# Patient Record
Sex: FEMALE | Race: WHITE | Employment: UNEMPLOYED | URBAN - METROPOLITAN AREA
[De-identification: names, ages, dates, MRNs, and addresses within clinical notes are randomized per-mention and may not be internally consistent; named-entity substitution may affect disease eponyms.]

---

## 2023-09-06 ENCOUNTER — APPOINTMENT (OUTPATIENT)
Dept: GENERAL RADIOLOGY | Age: 88
End: 2023-09-06
Payer: MEDICARE

## 2023-09-06 ENCOUNTER — HOSPITAL ENCOUNTER (EMERGENCY)
Age: 88
Discharge: NURSING, REHAB OR LONG-TERM CARE | End: 2023-09-07
Attending: STUDENT IN AN ORGANIZED HEALTH CARE EDUCATION/TRAINING PROGRAM
Payer: MEDICARE

## 2023-09-06 ENCOUNTER — APPOINTMENT (OUTPATIENT)
Dept: CT IMAGING | Age: 88
End: 2023-09-06
Payer: MEDICARE

## 2023-09-06 DIAGNOSIS — I95.9 TRANSIENT HYPOTENSION: Primary | ICD-10-CM

## 2023-09-06 LAB
ALBUMIN SERPL-MCNC: 3 G/DL (ref 3.4–5)
ALBUMIN/GLOB SERPL: 0.8 {RATIO} (ref 1.1–2.2)
ALP SERPL-CCNC: 125 U/L (ref 40–129)
ALT SERPL-CCNC: 25 U/L (ref 10–40)
ANION GAP SERPL CALCULATED.3IONS-SCNC: 12 MMOL/L (ref 3–16)
AST SERPL-CCNC: 27 U/L (ref 15–37)
BASOPHILS # BLD: 0.1 K/UL (ref 0–0.2)
BASOPHILS NFR BLD: 1.2 %
BILIRUB SERPL-MCNC: 0.3 MG/DL (ref 0–1)
BUN SERPL-MCNC: 26 MG/DL (ref 7–20)
CALCIUM SERPL-MCNC: 9.3 MG/DL (ref 8.3–10.6)
CHLORIDE SERPL-SCNC: 104 MMOL/L (ref 99–110)
CO2 SERPL-SCNC: 25 MMOL/L (ref 21–32)
CREAT SERPL-MCNC: 0.6 MG/DL (ref 0.6–1.2)
DEPRECATED RDW RBC AUTO: 14.8 % (ref 12.4–15.4)
EOSINOPHIL # BLD: 0.1 K/UL (ref 0–0.6)
EOSINOPHIL NFR BLD: 1.7 %
GFR SERPLBLD CREATININE-BSD FMLA CKD-EPI: >60 ML/MIN/{1.73_M2}
GLUCOSE SERPL-MCNC: 107 MG/DL (ref 70–99)
HCT VFR BLD AUTO: 31.4 % (ref 36–48)
HGB BLD-MCNC: 10 G/DL (ref 12–16)
LACTATE BLDV-SCNC: 1 MMOL/L (ref 0.4–1.9)
LYMPHOCYTES # BLD: 0.8 K/UL (ref 1–5.1)
LYMPHOCYTES NFR BLD: 8.8 %
MCH RBC QN AUTO: 29.5 PG (ref 26–34)
MCHC RBC AUTO-ENTMCNC: 31.9 G/DL (ref 31–36)
MCV RBC AUTO: 92.4 FL (ref 80–100)
MONOCYTES # BLD: 0.5 K/UL (ref 0–1.3)
MONOCYTES NFR BLD: 5.4 %
NEUTROPHILS # BLD: 7.1 K/UL (ref 1.7–7.7)
NEUTROPHILS NFR BLD: 82.9 %
PLATELET # BLD AUTO: 487 K/UL (ref 135–450)
PMV BLD AUTO: 8.1 FL (ref 5–10.5)
POTASSIUM SERPL-SCNC: 4.7 MMOL/L (ref 3.5–5.1)
PROT SERPL-MCNC: 6.9 G/DL (ref 6.4–8.2)
RBC # BLD AUTO: 3.39 M/UL (ref 4–5.2)
SODIUM SERPL-SCNC: 141 MMOL/L (ref 136–145)
WBC # BLD AUTO: 8.5 K/UL (ref 4–11)

## 2023-09-06 PROCEDURE — 87040 BLOOD CULTURE FOR BACTERIA: CPT

## 2023-09-06 PROCEDURE — 93005 ELECTROCARDIOGRAM TRACING: CPT | Performed by: STUDENT IN AN ORGANIZED HEALTH CARE EDUCATION/TRAINING PROGRAM

## 2023-09-06 PROCEDURE — 80053 COMPREHEN METABOLIC PANEL: CPT

## 2023-09-06 PROCEDURE — 71045 X-RAY EXAM CHEST 1 VIEW: CPT

## 2023-09-06 PROCEDURE — 99285 EMERGENCY DEPT VISIT HI MDM: CPT

## 2023-09-06 PROCEDURE — 36415 COLL VENOUS BLD VENIPUNCTURE: CPT

## 2023-09-06 PROCEDURE — 83605 ASSAY OF LACTIC ACID: CPT

## 2023-09-06 PROCEDURE — 2580000003 HC RX 258: Performed by: PHYSICIAN ASSISTANT

## 2023-09-06 PROCEDURE — 85025 COMPLETE CBC W/AUTO DIFF WBC: CPT

## 2023-09-06 RX ORDER — 0.9 % SODIUM CHLORIDE 0.9 %
500 INTRAVENOUS SOLUTION INTRAVENOUS ONCE
Status: COMPLETED | OUTPATIENT
Start: 2023-09-06 | End: 2023-09-06

## 2023-09-06 RX ORDER — LEVOFLOXACIN 500 MG/1
500 TABLET, FILM COATED ORAL DAILY
Qty: 10 TABLET | Refills: 0 | Status: SHIPPED | OUTPATIENT
Start: 2023-09-06 | End: 2023-09-16

## 2023-09-06 RX ADMIN — SODIUM CHLORIDE 500 ML: 9 INJECTION, SOLUTION INTRAVENOUS at 22:16

## 2023-09-06 ASSESSMENT — PAIN SCALES - GENERAL: PAINLEVEL_OUTOF10: 0

## 2023-09-06 ASSESSMENT — PAIN - FUNCTIONAL ASSESSMENT: PAIN_FUNCTIONAL_ASSESSMENT: NONE - DENIES PAIN

## 2023-09-06 ASSESSMENT — ENCOUNTER SYMPTOMS: COLOR CHANGE: 1

## 2023-09-06 NOTE — ED PROVIDER NOTES
In addition to the advanced practice provider, I personally saw Serina Alejandros and performed a substantive portion of the visit including all aspects of the medical decision making. Medical Decision Making    Patient brought in by EMS for evaluation after apparent hypotensive episode at nursing facility. Patient recently had CT angiogram of the right lower extremity showing no acute arterial thrombus after recent right distal fibular and tibia fracture. Patient has been getting weaker nursing facility, I spoke to her daughter and they do want uphold DNR comfort care status. Specifically no painful measures, chest compressions, CPR. There agreeable to us looking for infectious source or signs of MI. Patient is generally weak on exam, alert and answering questions with her daughter. Right foot is purple in color without palpable pulse, this is documented from yesterday with CT angio of the right lower extremity was performed. She has nonlabored breaths. Cardiac  rate and rhythm. EKG  The Ekg interpreted by me in the absence of a cardiologist shows. Normal sinus rhythm with ventricular rate of 96. Occasional PVC noted. Axis normal.  QTc appropriate. No specific ST or T wave abnormality is noted. No significant change from prior 4-. SEP-1  Is this patient to be included in the SEP-1 Core Measure due to severe sepsis or septic shock? No   Exclusion criteria - the patient is NOT to be included for SEP-1 Core Measure due to: Infection is not suspected    Screenings     Kiera Coma Scale  Eye Opening: Spontaneous  Best Verbal Response: Confused (Dementia)  Best Motor Response: Obeys commands  Kiera Coma Scale Score: 14         CTAangio abd aorta with bilateral runoff from yesterday reviewed in care everywhere:    \"Right lower extremity runoff: The common femoral artery is patent with mild atherosclerotic disease. 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  There

## 2023-09-06 NOTE — ED NOTES
Report given to [employer], v/u and denies any questions.       Rigo Roland RN  09/06/23 [id number]

## 2023-09-06 NOTE — ED PROVIDER NOTES
[facility]        Pt Name: Dipesh Kitchen  MRN: [id number]  [address] [facility] [place] 1/26/1932  Date of evaluation: 9/6/2023  Provider: Jose Meredith PA-C  PCP: Cheryl Freeman  Note Started: 6:42 PM EDT 9/6/23       I have seen and evaluated this patient with my supervising physician Bong Pta MD.      [address]       Chief Complaint   Patient presents with    Other     Patient in by [facility] from [facility]. Per EMS, called for low bp and unconscious patient, upon arrival patient alert, awake and talking. Facility states patient recently had fractures L foot and had surgery, splint noted to R foot, toes are purple. Patient has dementia. HISTORY OF PRESENT ILLNESS: 1 or more Elements     History From: patient, ems  Limitations to history : gutierrez Kitchen is a 80 y.o. female who presents for evaluation of possible hypotension. Per EMS they got called for unresponsiveness, upon arrival patient was alert and talking, normotensive. Apparently family still wanted her to be evaluated. EMS reports discoloration of the right foot. Splint is in place. Upon further chart review patient was seen and evaluated [facility] on 8/28/2023 with a right ankle fracture. No additional information is able to be obtained at this time. Nursing Notes were all reviewed and agreed with or any disagreements were addressed in the HPI. REVIEW OF SYSTEMS :      Review of Systems   Unable to perform ROS: Dementia   Skin:  Positive for color change. Positives and Pertinent negatives as per HPI.      SURGICAL HISTORY     Past Surgical History:   Procedure Laterality Date    CHOLECYSTECTOMY  02/2006    COLONOSCOPY  02/2007    Dr Suri Lieberman, +hyperplastic polyps    KNEE ARTHROSCOPY Left 11/1998    Dr Federico Elliott ARTHROSCOPY Right 11/2005    Dr Dilma Yin Right 12/1998    Dr Luis Mclaughlin

## 2023-09-06 NOTE — ED NOTES
Roxana KWON at bedside with doppler. Splint unwrapped and noted to be cut all the way up. Splint removed by [facility], deformity noted above patient's R ankle, and foot noted to be black and purple.       Jay Jay Urena RN  09/06/23 [id number]

## 2023-09-07 VITALS
DIASTOLIC BLOOD PRESSURE: 59 MMHG | RESPIRATION RATE: 22 BRPM | TEMPERATURE: 98 F | HEART RATE: 94 BPM | SYSTOLIC BLOOD PRESSURE: 117 MMHG | OXYGEN SATURATION: 95 %

## 2023-09-07 LAB
BACTERIA URNS QL MICRO: ABNORMAL /HPF
BILIRUB UR QL STRIP.AUTO: NEGATIVE
CLARITY UR: ABNORMAL
COLOR UR: YELLOW
EKG ATRIAL RATE: 96 BPM
EKG DIAGNOSIS: NORMAL
EKG P-R INTERVAL: 146 MS
EKG Q-T INTERVAL: 368 MS
EKG QRS DURATION: 66 MS
EKG QTC CALCULATION (BAZETT): 464 MS
EKG R AXIS: 48 DEGREES
EKG T AXIS: 118 DEGREES
EKG VENTRICULAR RATE: 96 BPM
EPI CELLS #/AREA URNS HPF: ABNORMAL /HPF (ref 0–5)
GLUCOSE UR STRIP.AUTO-MCNC: NEGATIVE MG/DL
HGB UR QL STRIP.AUTO: NEGATIVE
KETONES UR STRIP.AUTO-MCNC: 15 MG/DL
LEUKOCYTE ESTERASE UR QL STRIP.AUTO: NEGATIVE
NITRITE UR QL STRIP.AUTO: NEGATIVE
PH UR STRIP.AUTO: 6 [PH] (ref 5–8)
PROT UR STRIP.AUTO-MCNC: 30 MG/DL
RBC #/AREA URNS HPF: ABNORMAL /HPF (ref 0–4)
SP GR UR STRIP.AUTO: >=1.03 (ref 1–1.03)
UA COMPLETE W REFLEX CULTURE PNL UR: ABNORMAL
UA DIPSTICK W REFLEX MICRO PNL UR: YES
URN SPEC COLLECT METH UR: ABNORMAL
UROBILINOGEN UR STRIP-ACNC: 1 E.U./DL
WBC #/AREA URNS HPF: ABNORMAL /HPF (ref 0–5)

## 2023-09-07 PROCEDURE — 81003 URINALYSIS AUTO W/O SCOPE: CPT

## 2023-09-07 PROCEDURE — 93010 ELECTROCARDIOGRAM REPORT: CPT | Performed by: INTERNAL MEDICINE

## 2023-09-07 NOTE — ED NOTES
Multiple attempts to reach staff at [facility] unsuccessful. No  available per message, left  for return call.       Janett Justin, JODY  09/07/23 [id number]

## 2023-09-10 LAB — BACTERIA BLD CULT: NORMAL
